# Patient Record
Sex: MALE
[De-identification: names, ages, dates, MRNs, and addresses within clinical notes are randomized per-mention and may not be internally consistent; named-entity substitution may affect disease eponyms.]

---

## 2017-07-07 ENCOUNTER — HOSPITAL ENCOUNTER (OUTPATIENT)
Dept: HOSPITAL 5 - MRI | Age: 77
Discharge: HOME | End: 2017-07-07
Attending: NEUROLOGICAL SURGERY
Payer: MEDICARE

## 2017-07-07 DIAGNOSIS — I73.9: ICD-10-CM

## 2017-07-07 DIAGNOSIS — E78.00: ICD-10-CM

## 2017-07-07 DIAGNOSIS — M48.54XA: ICD-10-CM

## 2017-07-07 DIAGNOSIS — D64.9: ICD-10-CM

## 2017-07-07 DIAGNOSIS — M25.78: ICD-10-CM

## 2017-07-07 DIAGNOSIS — F32.9: ICD-10-CM

## 2017-07-07 DIAGNOSIS — I10: ICD-10-CM

## 2017-07-07 DIAGNOSIS — M12.88: ICD-10-CM

## 2017-07-07 DIAGNOSIS — M41.86: ICD-10-CM

## 2017-07-07 DIAGNOSIS — M51.36: ICD-10-CM

## 2017-07-07 DIAGNOSIS — M48.06: Primary | ICD-10-CM

## 2017-07-07 LAB — BUN SERPL-MCNC: 30 MG/DL (ref 9–20)

## 2017-07-07 PROCEDURE — 72148 MRI LUMBAR SPINE W/O DYE: CPT

## 2017-07-07 PROCEDURE — 84520 ASSAY OF UREA NITROGEN: CPT

## 2017-07-07 PROCEDURE — 36415 COLL VENOUS BLD VENIPUNCTURE: CPT

## 2017-07-07 PROCEDURE — 72114 X-RAY EXAM L-S SPINE BENDING: CPT

## 2017-07-07 PROCEDURE — 82565 ASSAY OF CREATININE: CPT

## 2017-07-08 NOTE — XRAY REPORT
LUMBAR SPINE WITH OBLIQUES AND FLEXION-EXTENSION SEVEN VIEWS: 07/07/17 

16:34:00





CLINICAL: Lumbar stenosis with neurogenic claudication.



FINDINGS: Levoscoliosis centered at L3-4.  Grade I L2-3 retrolisthesis 

is identified in neutral as well as flexed and extended positions.  On 

the AP view, grade I right lateral listhesis at L3-4.  Disc space 

narrowing with anterior-posterior osteophytes and endplate sclerosis at 

L2-3 and L1-2.  Mild anterior T12 wedge compression fracture with no 

fracture lines identified.  Large lateral osteophytes at T12-L1 and on 

the left at L2-3, L3-4 and L4-5.  Severe multilevel bilateral neural 

foraminal stenosis on oblique views.  Multilevel facet joint sclerosis. 

 There is an IVC filter.  Mild calcification of the abdominal aorta and 

iliac arteries.  



IMPRESSION: Scoliosis and extensive multilevel degenerative disc 

disease.Grade I L2-3 retrolisthesis with no instability on flexion and 

extension.  Grade I right lateral listhesis at L3-4.  Extensive 

multilevel facet joint arthropathy and multilevel neural foraminal 

stenosis.  A probably chronic T12 anterior wedge compression fracture.

## 2017-07-09 NOTE — MAGNETIC RESONANCE REPORT
MRI LUMBAR SPINE WITHOUT CONTRAST: 07/07/17



CLINICAL: Lumbar Stenosis.

COMPARISON: 09/16/14 CT lumbar spine





TECHNIQUE: Sagittal and axial T1 and T2, and sagittal STIR sequences on 

a 1.5 Denisha magnet without contrast.  



FINDINGS: Grade I L2-3 retrolisthesis and grade I L5-S1 retrolisthesis. 

 Status post midline posterior decompression and laminectomy from L1 to 

through L4.  The conus medullaris is normal and terminates at L1.



L1-2: Degeneration of the disc and mild circumferential disc bulge.  

Large anterior osteophyte and minimal posterior osteophyte. 

Irregularity of the inferior L1 and superior L2 endplates with mild 

Modic signal changes.  Moderate left and mild right neural foraminal 

stenosis.



L2-3: Degeneration of the disc with narrowing of the disc space and 

large anterior osteophyte.  Grade I L2-3 retrolisthesis.  Bilateral 

facet hypertrophy.  Marked right neural foraminal stenosis and moderate 

left neural foraminal stenosis secondary to osteophytes and facet 

hypertrophy.



L3-4: Moderate circumferential disc bulge.  Bilateral facet hypertrophy 

and marked bilateral neural foraminal stenosis secondary to facet 

hypertrophy and osteophytes.



L4-5: Degeneration of the disc and a small posterior central 

osteophyte.  Bilateral facet hypertrophy and marked bilateral neural 

foraminal stenosis secondary to facet hypertrophy and osteophytes.



L5-S1: Mild circumferential disc bulge.  Bilateral facet hypertrophy 

and marked bilateral neural foraminal stenosis secondary to facet 

hypertrophy and osteophytes.



IMPRESSION: 1. Status post midline posterior decompression and 

laminectomy from L1 to through L4.  Minimal postsurgical scar.  2.  

Multilevel neural foraminal stenosis secondary to facet hypertrophy and 

osteophytes.  3.  No central canal stenosis.

## 2020-01-20 ENCOUNTER — HOSPITAL ENCOUNTER (OUTPATIENT)
Dept: HOSPITAL 5 - XRAY | Age: 80
Discharge: HOME | End: 2020-01-20
Attending: INTERNAL MEDICINE
Payer: MEDICARE

## 2020-01-20 DIAGNOSIS — R79.1: ICD-10-CM

## 2020-01-20 DIAGNOSIS — Z01.810: Primary | ICD-10-CM

## 2020-01-20 LAB
ALBUMIN SERPL-MCNC: 3.7 G/DL (ref 3.9–5)
ALT SERPL-CCNC: 10 UNITS/L (ref 7–56)
APTT BLD: 38.7 SEC. (ref 24.2–36.6)
BASOPHILS # (AUTO): 0.1 K/MM3 (ref 0–0.1)
BASOPHILS NFR BLD AUTO: 0.8 % (ref 0–1.8)
BUN SERPL-MCNC: 19 MG/DL (ref 9–20)
BUN/CREAT SERPL: 16 %
CALCIUM SERPL-MCNC: 9.1 MG/DL (ref 8.4–10.2)
EOSINOPHIL # BLD AUTO: 0.7 K/MM3 (ref 0–0.4)
EOSINOPHIL NFR BLD AUTO: 8.3 % (ref 0–4.3)
HCT VFR BLD CALC: 33.3 % (ref 35.5–45.6)
HEMOLYSIS INDEX: 0
HGB BLD-MCNC: 11 GM/DL (ref 11.8–15.2)
INR PPP: 1.81 (ref 0.87–1.13)
LYMPHOCYTES # BLD AUTO: 2.2 K/MM3 (ref 1.2–5.4)
LYMPHOCYTES NFR BLD AUTO: 26.9 % (ref 13.4–35)
MCHC RBC AUTO-ENTMCNC: 33 % (ref 32–34)
MCV RBC AUTO: 97 FL (ref 84–94)
MONOCYTES # (AUTO): 0.8 K/MM3 (ref 0–0.8)
MONOCYTES % (AUTO): 9.8 % (ref 0–7.3)
PLATELET # BLD: 297 K/MM3 (ref 140–440)
RBC # BLD AUTO: 3.42 M/MM3 (ref 3.65–5.03)

## 2020-01-20 PROCEDURE — 82306 VITAMIN D 25 HYDROXY: CPT

## 2020-01-20 PROCEDURE — 85025 COMPLETE CBC W/AUTO DIFF WBC: CPT

## 2020-01-20 PROCEDURE — 36415 COLL VENOUS BLD VENIPUNCTURE: CPT

## 2020-01-20 PROCEDURE — 85610 PROTHROMBIN TIME: CPT

## 2020-01-20 PROCEDURE — 82607 VITAMIN B-12: CPT

## 2020-01-20 PROCEDURE — 71046 X-RAY EXAM CHEST 2 VIEWS: CPT

## 2020-01-20 PROCEDURE — 80053 COMPREHEN METABOLIC PANEL: CPT

## 2020-01-20 PROCEDURE — 85730 THROMBOPLASTIN TIME PARTIAL: CPT

## 2020-01-20 NOTE — XRAY REPORT
CHEST 2 VIEWS 



INDICATION:  Z01.810 ENCOUNTER FOR PREPROCEDURAL CARDIOVASCULAR EXAM.



COMPARISON:  None



FINDINGS:

Support devices: Thoracic stimulator device terminates near the level of T8.



Heart: Within normal limits. 

Lungs/pleura: No acute air space or interstitial disease.  No pneumothorax.



Additional findings: None.



IMPRESSION:

No acute findings.



Signer Name: Abdullahi Valdovinos Jr, MD 

Signed: 1/20/2020 11:21 AM

 Workstation Name: [id number]

## 2020-01-23 LAB — VITAMIN D2 SERPL-MCNC: 36 NG/ML

## 2020-05-28 ENCOUNTER — HOSPITAL ENCOUNTER (OUTPATIENT)
Dept: HOSPITAL 5 - XRAY | Age: 80
Discharge: HOME | End: 2020-05-28
Attending: INTERNAL MEDICINE
Payer: MEDICARE

## 2020-05-28 DIAGNOSIS — Z01.810: Primary | ICD-10-CM

## 2020-05-28 DIAGNOSIS — I26.99: ICD-10-CM

## 2020-05-28 LAB
ALBUMIN SERPL-MCNC: 4 G/DL (ref 3.9–5)
ALT SERPL-CCNC: 11 UNITS/L (ref 7–56)
APTT BLD: 35.1 SEC. (ref 24.2–36.6)
BASOPHILS # (AUTO): 0.1 K/MM3 (ref 0–0.1)
BASOPHILS NFR BLD AUTO: 0.6 % (ref 0–1.8)
BUN SERPL-MCNC: 22 MG/DL (ref 9–20)
BUN/CREAT SERPL: 17 %
CALCIUM SERPL-MCNC: 9.9 MG/DL (ref 8.4–10.2)
EOSINOPHIL # BLD AUTO: 0.4 K/MM3 (ref 0–0.4)
EOSINOPHIL NFR BLD AUTO: 4.4 % (ref 0–4.3)
HCT VFR BLD CALC: 47.1 % (ref 35.5–45.6)
HDLC SERPL-MCNC: 43 MG/DL (ref 40–59)
HEMOLYSIS INDEX: 22
HGB BLD-MCNC: 15.5 GM/DL (ref 11.8–15.2)
INR PPP: 1.87 (ref 0.87–1.13)
LYMPHOCYTES # BLD AUTO: 2.3 K/MM3 (ref 1.2–5.4)
LYMPHOCYTES NFR BLD AUTO: 27 % (ref 13.4–35)
MCHC RBC AUTO-ENTMCNC: 33 % (ref 32–34)
MCV RBC AUTO: 99 FL (ref 84–94)
MONOCYTES # (AUTO): 0.6 K/MM3 (ref 0–0.8)
MONOCYTES % (AUTO): 7.4 % (ref 0–7.3)
PLATELET # BLD: 258 K/MM3 (ref 140–440)
RBC # BLD AUTO: 4.76 M/MM3 (ref 3.65–5.03)

## 2020-05-28 PROCEDURE — 85610 PROTHROMBIN TIME: CPT

## 2020-05-28 PROCEDURE — 83036 HEMOGLOBIN GLYCOSYLATED A1C: CPT

## 2020-05-28 PROCEDURE — 36415 COLL VENOUS BLD VENIPUNCTURE: CPT

## 2020-05-28 PROCEDURE — 71046 X-RAY EXAM CHEST 2 VIEWS: CPT

## 2020-05-28 PROCEDURE — 85730 THROMBOPLASTIN TIME PARTIAL: CPT

## 2020-05-28 PROCEDURE — 80061 LIPID PANEL: CPT

## 2020-05-28 PROCEDURE — 84443 ASSAY THYROID STIM HORMONE: CPT

## 2020-05-28 PROCEDURE — 80053 COMPREHEN METABOLIC PANEL: CPT

## 2020-05-28 PROCEDURE — 85025 COMPLETE CBC W/AUTO DIFF WBC: CPT

## 2020-05-28 NOTE — XRAY REPORT
CHEST 2 VIEWS 



INDICATION / CLINICAL INFORMATION:

ENCOUNTER FOR PREPROCEDURAL CARDIOVASCULAR EXAMINATION.



COMPARISON: 

1/20/2020



FINDINGS:



SUPPORT DEVICES: None.



HEART / MEDIASTINUM: No significant abnormality. 



LUNGS / PLEURA: No significant pulmonary or pleural abnormality. No pneumothorax. 



ADDITIONAL FINDINGS: No significant additional findings.



IMPRESSION:

1. No acute findings.



Signer Name: Stone Waterman MD 

Signed: 5/28/2020 11:42 AM

Workstation Name: [id number]

## 2020-06-03 ENCOUNTER — TELEPHONE ENCOUNTER (OUTPATIENT)
Dept: URBAN - METROPOLITAN AREA CLINIC 92 | Facility: CLINIC | Age: 80
End: 2020-06-03

## 2020-06-03 RX ORDER — SUCRALFATE 1 G/1
1 TABLET ON AN EMPTY STOMACH TABLET ORAL TID
Qty: 270 TABLET | Refills: 3 | OUTPATIENT
Start: 2020-06-03 | End: 2021-05-29

## 2020-06-03 RX ORDER — PANTOPRAZOLE SODIUM 40 MG/1
TAKE 1 TABLET (40 MG) BY ORAL ROUTE ONCE DAILY FOR 90 DAYS TABLET, DELAYED RELEASE ORAL 1
Qty: 90 | Refills: 3 | Status: ACTIVE | COMMUNITY
Start: 2020-03-18 | End: 2021-03-13

## 2020-06-03 RX ORDER — FINASTERIDE 5 MG/1
TAKE 1 TABLET (5 MG) BY ORAL ROUTE ONCE DAILY TABLET, FILM COATED ORAL 1
Qty: 0 | Refills: 0 | Status: ACTIVE | COMMUNITY
Start: 1900-01-01 | End: 1900-01-01

## 2020-06-03 RX ORDER — SUCRALFATE 1 G/1
TAKE 1 TABLET BY ORAL ROUTE 3 TIMES A DAY AS NEEDED FOR 30 DAYS TABLET ORAL
Qty: 90 | Refills: 5 | Status: ACTIVE | COMMUNITY
Start: 2020-04-30 | End: 2020-10-27

## 2020-06-03 RX ORDER — TAMSULOSIN HYDROCHLORIDE 0.4 MG/1
CAPSULE ORAL
Qty: 0 | Refills: 0 | Status: ACTIVE | COMMUNITY
Start: 1900-01-01 | End: 1900-01-01

## 2020-06-04 ENCOUNTER — TELEPHONE ENCOUNTER (OUTPATIENT)
Dept: URBAN - METROPOLITAN AREA CLINIC 92 | Facility: CLINIC | Age: 80
End: 2020-06-04

## 2020-06-04 RX ORDER — SUCRALFATE 1 G/1
1 TABLET ON AN EMPTY STOMACH TABLET ORAL TWICE A DAY
Qty: 180 TABLET | Refills: 3 | OUTPATIENT
Start: 2020-06-04 | End: 2021-05-30

## 2020-07-13 ENCOUNTER — HOSPITAL ENCOUNTER (OUTPATIENT)
Dept: HOSPITAL 5 - MAMMO | Age: 80
Discharge: HOME | End: 2020-07-13
Attending: INTERNAL MEDICINE
Payer: MEDICARE

## 2020-07-13 DIAGNOSIS — M51.36: Primary | ICD-10-CM

## 2020-07-13 DIAGNOSIS — M85.88: ICD-10-CM

## 2020-07-13 PROCEDURE — 77080 DXA BONE DENSITY AXIAL: CPT

## 2020-07-17 NOTE — MAMMOGRAPHY REPORT
DEXA BONE DENSITY SCAN



INDICATION / CLINICAL INFORMATION:

DISC DEGENERATION.

79 years Male



COMPARISON: 

None available.



LUMBAR SPINE, L1-L4:

- Bone mineral density (BMD) = 1.442 g/cm2.

- T-score = 3.2 

- Z-score = 4.4 



Spine density may be artificially elevated by degenerative sclerosis.



LEFT HIP, NECK :

- Bone mineral density (BMD) = 0.678 g/cm2.

- T-score = -1.9 

- Z-score = -0.3 



Change (%) since most recent prior (if available):  None available.



10-YEAR FRACTURE RISK (FRAX) (%) based on LEFT FEMUR

- Major Osteoporotic Fracture / Hip Fracture: 7.9% / 3.1% 



IMPRESSION:

1. WHO Classification: Osteopenia. Fracture Risk: Increased.







=================================================================

BMD Reporting Guidelines (ISCD, 2015)

=================================================================

BMD Reporting in Postmenopausal Women and in Men Age 50 and Older

- T-scores are preferred.

- The WHO densitometric classification is applicable.



BMD Reporting in Females Prior to Menopause and in Males Younger Than Age 50

- Z-scores, not T-scores, are preferred. This is particularly important in children.

- A Z-score of -2.0 or lower is defined as below the expected range for age, and a Z-score above -2.0
 is within the expected range for age.

- Osteoporosis cannot be diagnosed in men under age 50 on the basis of BMD alone.

- The WHO diagnostic criteria may be applied to women in the menopausal transition.





http://www.iscd.org/official-positions/8692-auvf-ponovpah-positions-adult/





Signer Name: WARREN Romano MD 

Signed: 7/17/2020 5:29 PM

Workstation Name: [id number]

## 2020-09-10 ENCOUNTER — OFFICE VISIT (OUTPATIENT)
Dept: URBAN - METROPOLITAN AREA CLINIC 118 | Facility: CLINIC | Age: 80
End: 2020-09-10
Payer: MEDICARE

## 2020-09-10 DIAGNOSIS — K29.50 CHRONIC ANTRAL GASTRITIS: ICD-10-CM

## 2020-09-10 DIAGNOSIS — K59.03 DRUG-INDUCED CONSTIPATION: ICD-10-CM

## 2020-09-10 DIAGNOSIS — K30 NUD (NONULCER DYSPEPSIA): ICD-10-CM

## 2020-09-10 DIAGNOSIS — R10.13 EPIGASTRIC ABDOMINAL PAIN: ICD-10-CM

## 2020-09-10 PROCEDURE — G8420 CALC BMI NORM PARAMETERS: HCPCS | Performed by: INTERNAL MEDICINE

## 2020-09-10 PROCEDURE — 1036F TOBACCO NON-USER: CPT | Performed by: INTERNAL MEDICINE

## 2020-09-10 PROCEDURE — G8427 DOCREV CUR MEDS BY ELIG CLIN: HCPCS | Performed by: INTERNAL MEDICINE

## 2020-09-10 PROCEDURE — 99214 OFFICE O/P EST MOD 30 MIN: CPT | Performed by: INTERNAL MEDICINE

## 2020-09-10 RX ORDER — SUCRALFATE 1 G/1
TAKE 1 TABLET BY ORAL ROUTE 3 TIMES A DAY AS NEEDED FOR 30 DAYS TABLET ORAL
Qty: 90 | Refills: 5 | Status: ACTIVE | COMMUNITY
Start: 2020-04-30 | End: 2020-10-27

## 2020-09-10 RX ORDER — TAMSULOSIN HYDROCHLORIDE 0.4 MG/1
CAPSULE ORAL
Qty: 0 | Refills: 0 | Status: ACTIVE | COMMUNITY
Start: 1900-01-01

## 2020-09-10 RX ORDER — SUCRALFATE 1 G/1
1 TABLET ON AN EMPTY STOMACH TABLET ORAL TID
Qty: 270 TABLET | Refills: 3 | Status: ACTIVE | COMMUNITY
Start: 2020-06-03 | End: 2021-05-29

## 2020-09-10 RX ORDER — FINASTERIDE 5 MG/1
TAKE 1 TABLET (5 MG) BY ORAL ROUTE ONCE DAILY TABLET, FILM COATED ORAL 1
Qty: 0 | Refills: 0 | Status: ACTIVE | COMMUNITY
Start: 1900-01-01

## 2020-09-10 RX ORDER — PANTOPRAZOLE SODIUM 40 MG/1
TAKE 1 TABLET (40 MG) BY ORAL ROUTE ONCE DAILY FOR 90 DAYS TABLET, DELAYED RELEASE ORAL 1
Qty: 90 | Refills: 3 | Status: ACTIVE | COMMUNITY
Start: 2020-03-18 | End: 2021-03-13

## 2020-09-10 RX ORDER — SUCRALFATE 1 G/1
1 TABLET ON AN EMPTY STOMACH TABLET ORAL TWICE A DAY
Qty: 180 TABLET | Refills: 3 | Status: ACTIVE | COMMUNITY
Start: 2020-06-04 | End: 2021-05-30

## 2020-09-10 NOTE — HPI-TODAY'S VISIT:
The patient was last seen in 2019 prior to an attack of GS pancreatitis.  He had a CCY/ERCP, and did well.  Then, he underwent further back surgery in 2020, and is now weaning off all narcotics.  He has no N/V, and his constipation is mildly better.  He has last another 5 pounds through watching his weight, but he continues to have some mild epigastric tenderness, though not nearly as severe as his pancreatitis.  He is compliant with PPI/carafate therapy for his NUD/GERD.  He is not using NSAIDs.  He sees no blood in his stools.

## 2020-09-11 LAB
ALBUMIN: 3.6
ALKALINE PHOSPHATASE: 82
ALT (SGPT): 10
AST (SGOT): 15
BILIRUBIN, DIRECT: 0.09
BILIRUBIN, TOTAL: 0.3
LIPASE: 55
PROTEIN, TOTAL: 6.8

## 2020-11-11 ENCOUNTER — HOSPITAL ENCOUNTER (OUTPATIENT)
Dept: HOSPITAL 5 - LAB | Age: 80
Discharge: HOME | End: 2020-11-11
Attending: INTERNAL MEDICINE
Payer: MEDICARE

## 2020-11-11 DIAGNOSIS — R94.6: ICD-10-CM

## 2020-11-11 DIAGNOSIS — D64.9: Primary | ICD-10-CM

## 2020-11-11 DIAGNOSIS — R73.03: ICD-10-CM

## 2020-11-11 LAB
BASOPHILS # (AUTO): 0 K/MM3 (ref 0–0.1)
BASOPHILS NFR BLD AUTO: 0.5 % (ref 0–1.8)
BUN SERPL-MCNC: 26 MG/DL (ref 9–20)
BUN/CREAT SERPL: 24 %
CALCIUM SERPL-MCNC: 9.5 MG/DL (ref 8.4–10.2)
EOSINOPHIL # BLD AUTO: 0.1 K/MM3 (ref 0–0.4)
EOSINOPHIL NFR BLD AUTO: 1 % (ref 0–4.3)
HCT VFR BLD CALC: 35.9 % (ref 35.5–45.6)
HEMOLYSIS INDEX: 2
HGB BLD-MCNC: 12.2 GM/DL (ref 11.8–15.2)
IRON SERPL-MCNC: 38 UG/DL (ref 49–181)
LYMPHOCYTES # BLD AUTO: 1.8 K/MM3 (ref 1.2–5.4)
LYMPHOCYTES NFR BLD AUTO: 16.8 % (ref 13.4–35)
MCHC RBC AUTO-ENTMCNC: 34 % (ref 32–34)
MCV RBC AUTO: 92 FL (ref 84–94)
MONOCYTES # (AUTO): 0.8 K/MM3 (ref 0–0.8)
MONOCYTES % (AUTO): 7.2 % (ref 0–7.3)
PLATELET # BLD: 233 K/MM3 (ref 140–440)
RBC # BLD AUTO: 3.92 M/MM3 (ref 3.65–5.03)

## 2020-11-11 PROCEDURE — 84443 ASSAY THYROID STIM HORMONE: CPT

## 2020-11-11 PROCEDURE — 36415 COLL VENOUS BLD VENIPUNCTURE: CPT

## 2020-11-11 PROCEDURE — 85025 COMPLETE CBC W/AUTO DIFF WBC: CPT

## 2020-11-11 PROCEDURE — 80048 BASIC METABOLIC PNL TOTAL CA: CPT

## 2020-11-11 PROCEDURE — 83540 ASSAY OF IRON: CPT

## 2020-11-11 PROCEDURE — 82728 ASSAY OF FERRITIN: CPT

## 2021-03-02 ENCOUNTER — OFFICE VISIT (OUTPATIENT)
Dept: URBAN - METROPOLITAN AREA CLINIC 118 | Facility: CLINIC | Age: 81
End: 2021-03-02
Payer: MEDICARE

## 2021-03-02 ENCOUNTER — LAB OUTSIDE AN ENCOUNTER (OUTPATIENT)
Dept: URBAN - METROPOLITAN AREA CLINIC 118 | Facility: CLINIC | Age: 81
End: 2021-03-02

## 2021-03-02 DIAGNOSIS — K59.01 SLOW TRANSIT CONSTIPATION: ICD-10-CM

## 2021-03-02 DIAGNOSIS — R63.0 ANOREXIA: ICD-10-CM

## 2021-03-02 DIAGNOSIS — K30 NUD (NONULCER DYSPEPSIA): ICD-10-CM

## 2021-03-02 DIAGNOSIS — R68.81 EARLY SATIETY: ICD-10-CM

## 2021-03-02 PROCEDURE — 99214 OFFICE O/P EST MOD 30 MIN: CPT | Performed by: INTERNAL MEDICINE

## 2021-03-02 RX ORDER — TAMSULOSIN HYDROCHLORIDE 0.4 MG/1
CAPSULE ORAL
Qty: 0 | Refills: 0 | Status: ACTIVE | COMMUNITY
Start: 1900-01-01

## 2021-03-02 RX ORDER — SUCRALFATE 1 G/1
1 TABLET ON AN EMPTY STOMACH TABLET ORAL TID
Qty: 270 TABLET | Refills: 3 | Status: ACTIVE | COMMUNITY
Start: 2020-06-03 | End: 2021-05-29

## 2021-03-02 RX ORDER — SUCRALFATE 1 G/1
1 TABLET ON AN EMPTY STOMACH TABLET ORAL TWICE A DAY
Qty: 180 TABLET | Refills: 3 | Status: DISCONTINUED | COMMUNITY
Start: 2020-06-04 | End: 2021-05-30

## 2021-03-02 RX ORDER — FINASTERIDE 5 MG/1
TAKE 1 TABLET (5 MG) BY ORAL ROUTE ONCE DAILY TABLET, FILM COATED ORAL 1
Qty: 0 | Refills: 0 | Status: ACTIVE | COMMUNITY
Start: 1900-01-01

## 2021-03-02 RX ORDER — PANTOPRAZOLE SODIUM 40 MG/1
TAKE 1 TABLET (40 MG) BY ORAL ROUTE ONCE DAILY FOR 90 DAYS TABLET, DELAYED RELEASE ORAL 1
Qty: 90 | Refills: 3 | Status: ACTIVE | COMMUNITY
Start: 2020-03-18 | End: 2021-03-13

## 2021-03-02 NOTE — HPI-TODAY'S VISIT:
The patient is following up after a visit in September for dyspepsia and abdominal pain.  His lab work, including pancreas and liver labs, were within normal limits.  He has had a worsening of his epigastric burning with some early satiety ever since starting potassium citrate by his nephrologist.  However he denies vomiting, weight loss, blood with his bowel movements, or hematemesis.  He is still occasionally using narcotics for his chronic back pain, but this only causes mild constipation not needing laxative therapy.  He is on Carafate therapy, which he thinks may be worsening his constipation.  However, he has a bowel movement almost every day.  He is compliant with his current Protonix therapy on a daily basis.  His weight is stable without any significant loss since his last visit.

## 2021-03-04 PROBLEM — 3696007: Status: ACTIVE | Noted: 2021-03-04

## 2021-03-04 PROBLEM — 35298007: Status: ACTIVE | Noted: 2021-03-04

## 2021-05-09 ENCOUNTER — ERX REFILL RESPONSE (OUTPATIENT)
Dept: URBAN - METROPOLITAN AREA CLINIC 118 | Facility: CLINIC | Age: 81
End: 2021-05-09

## 2021-05-09 RX ORDER — PANTOPRAZOLE SODIUM 40 MG/1
TAKE 1 TABLET DAILY TABLET, DELAYED RELEASE ORAL
Qty: 90 | Refills: 3

## 2021-11-12 ENCOUNTER — TELEPHONE ENCOUNTER (OUTPATIENT)
Dept: URBAN - METROPOLITAN AREA CLINIC 118 | Facility: CLINIC | Age: 81
End: 2021-11-12

## 2021-11-19 ENCOUNTER — LAB OUTSIDE AN ENCOUNTER (OUTPATIENT)
Dept: URBAN - METROPOLITAN AREA CLINIC 118 | Facility: CLINIC | Age: 81
End: 2021-11-19

## 2021-11-19 ENCOUNTER — OFFICE VISIT (OUTPATIENT)
Dept: URBAN - METROPOLITAN AREA CLINIC 118 | Facility: CLINIC | Age: 81
End: 2021-11-19
Payer: MEDICARE

## 2021-11-19 DIAGNOSIS — R93.3 ABNORMAL UPPER GASTROINTESTINAL BARIUM SERIES: ICD-10-CM

## 2021-11-19 DIAGNOSIS — R68.81 EARLY SATIETY: ICD-10-CM

## 2021-11-19 PROBLEM — 79890006: Status: ACTIVE | Noted: 2021-03-02

## 2021-11-19 PROCEDURE — 99214 OFFICE O/P EST MOD 30 MIN: CPT | Performed by: INTERNAL MEDICINE

## 2021-11-19 RX ORDER — FINASTERIDE 5 MG/1
TAKE 1 TABLET (5 MG) BY ORAL ROUTE ONCE DAILY TABLET, FILM COATED ORAL 1
Qty: 0 | Refills: 0 | Status: ACTIVE | COMMUNITY
Start: 1900-01-01

## 2021-11-19 RX ORDER — TAMSULOSIN HYDROCHLORIDE 0.4 MG/1
CAPSULE ORAL
Qty: 0 | Refills: 0 | Status: ACTIVE | COMMUNITY
Start: 1900-01-01

## 2021-11-19 RX ORDER — PANTOPRAZOLE SODIUM 40 MG/1
TAKE 1 TABLET DAILY TABLET, DELAYED RELEASE ORAL
Qty: 90 | Refills: 3 | Status: ACTIVE | COMMUNITY

## 2021-11-19 NOTE — HPI-TODAY'S VISIT:
The patient is following up after an office visit in March.  He had an upper GI series that showed significant gastritis reflux, and dysmotility.  He continues to have problems with dyspepsia despite taking Protonix every morning.  His weight is stable however and there is no hematemesis.  He describes early satiety with bloating will often eat small volumes of meals, but has gained 4 pounds since his last visit.  There is no blood in the stools.  He denies excess NSAID use and has decreased the amount of narcotics to about once a week.  He does have an upcoming hip surgery for a hip fracture from a recent fall.  He has changed no other new medications.

## 2021-11-22 ENCOUNTER — OFFICE VISIT (OUTPATIENT)
Dept: URBAN - METROPOLITAN AREA SURGERY CENTER 23 | Facility: SURGERY CENTER | Age: 81
End: 2021-11-22
Payer: MEDICARE

## 2021-11-22 ENCOUNTER — CLAIMS CREATED FROM THE CLAIM WINDOW (OUTPATIENT)
Dept: URBAN - METROPOLITAN AREA CLINIC 4 | Facility: CLINIC | Age: 81
End: 2021-11-22
Payer: MEDICARE

## 2021-11-22 DIAGNOSIS — K31.89 DEFORMED PYLORUS, ACQUIRED: ICD-10-CM

## 2021-11-22 DIAGNOSIS — K31.89 ACQUIRED DEFORMITY OF DUODENUM: ICD-10-CM

## 2021-11-22 DIAGNOSIS — R93.3 ABN FINDINGS-GI TRACT: ICD-10-CM

## 2021-11-22 PROCEDURE — G8907 PT DOC NO EVENTS ON DISCHARG: HCPCS | Performed by: INTERNAL MEDICINE

## 2021-11-22 PROCEDURE — 88305 TISSUE EXAM BY PATHOLOGIST: CPT | Performed by: PATHOLOGY

## 2021-11-22 PROCEDURE — 43239 EGD BIOPSY SINGLE/MULTIPLE: CPT | Performed by: INTERNAL MEDICINE

## 2021-11-22 PROCEDURE — 88312 SPECIAL STAINS GROUP 1: CPT | Performed by: PATHOLOGY

## 2022-03-18 ENCOUNTER — ERX REFILL RESPONSE (OUTPATIENT)
Dept: URBAN - METROPOLITAN AREA CLINIC 118 | Facility: CLINIC | Age: 82
End: 2022-03-18

## 2022-03-18 RX ORDER — PANTOPRAZOLE SODIUM 40 MG/1
TAKE 1 TABLET DAILY TABLET, DELAYED RELEASE ORAL
Qty: 90 TABLET | Refills: 4 | OUTPATIENT

## 2022-10-26 ENCOUNTER — OFFICE VISIT (OUTPATIENT)
Dept: URBAN - METROPOLITAN AREA CLINIC 118 | Facility: CLINIC | Age: 82
End: 2022-10-26
Payer: MEDICARE

## 2022-10-26 ENCOUNTER — LAB OUTSIDE AN ENCOUNTER (OUTPATIENT)
Dept: URBAN - METROPOLITAN AREA CLINIC 118 | Facility: CLINIC | Age: 82
End: 2022-10-26

## 2022-10-26 VITALS
TEMPERATURE: 97.7 F | WEIGHT: 225.4 LBS | SYSTOLIC BLOOD PRESSURE: 136 MMHG | HEIGHT: 69 IN | HEART RATE: 67 BPM | BODY MASS INDEX: 33.38 KG/M2 | DIASTOLIC BLOOD PRESSURE: 87 MMHG

## 2022-10-26 DIAGNOSIS — R68.81 EARLY SATIETY: ICD-10-CM

## 2022-10-26 DIAGNOSIS — K59.04 CHRONIC IDIOPATHIC CONSTIPATION: ICD-10-CM

## 2022-10-26 PROCEDURE — 99214 OFFICE O/P EST MOD 30 MIN: CPT | Performed by: INTERNAL MEDICINE

## 2022-10-26 RX ORDER — ALLOPURINOL 100 MG/1
1 TABLET TABLET ORAL ONCE A DAY
Refills: 0 | Status: ACTIVE | COMMUNITY
Start: 1900-01-01

## 2022-10-26 RX ORDER — RIVAROXABAN 20 MG/1
1 TABLET TABLET, FILM COATED ORAL ONCE A DAY
Refills: 0 | Status: ACTIVE | COMMUNITY
Start: 1900-01-01

## 2022-10-26 RX ORDER — PANTOPRAZOLE SODIUM 40 MG/1
TAKE 1 TABLET DAILY TABLET, DELAYED RELEASE ORAL
Qty: 90 TABLET | Refills: 4 | Status: ACTIVE | COMMUNITY

## 2022-10-26 RX ORDER — POTASSIUM CITRATE 15 MEQ/1
1 TABLET WITH MEALS TABLET, EXTENDED RELEASE ORAL TWICE A DAY
Status: ACTIVE | COMMUNITY

## 2022-10-26 RX ORDER — TAMSULOSIN HYDROCHLORIDE 0.4 MG/1
CAPSULE ORAL
Qty: 0 | Refills: 0 | Status: ACTIVE | COMMUNITY
Start: 1900-01-01

## 2022-10-26 RX ORDER — FINASTERIDE 5 MG/1
TAKE 1 TABLET (5 MG) BY ORAL ROUTE ONCE DAILY TABLET, FILM COATED ORAL 1
Qty: 0 | Refills: 0 | Status: ACTIVE | COMMUNITY
Start: 1900-01-01

## 2022-10-26 RX ORDER — AMLODIPINE BESYLATE AND BENAZEPRIL HYDROCHLORIDE 5; 10 MG/1; MG/1
AS DIRECTED CAPSULE ORAL
Refills: 0 | Status: ACTIVE | COMMUNITY
Start: 1900-01-01

## 2022-10-26 RX ORDER — GLUCOSAMINE SULFATE 500 MG
2 CAPSULE WITH A MEAL CAPSULE ORAL
Status: ACTIVE | COMMUNITY

## 2022-10-26 RX ORDER — SIMVASTATIN 40 MG
1 TABLET IN THE EVENING TABLET ORAL ONCE A DAY
Refills: 0 | Status: ACTIVE | COMMUNITY
Start: 1900-01-01

## 2022-10-26 NOTE — PHYSICAL EXAM MUSCULOSKELETAL:
normal gait and station , no tenderness or deformities present Adequate: hears normal conversation without difficulty

## 2022-10-26 NOTE — HPI-TODAY'S VISIT:
Patient is following up after an upper endoscopy last year.  This was done for early satiety but there was no signs of gastric outlet obstruction, peptic ulcer disease, or other mucosal abnormality.  He continues to have severe early satiety but despite that has gained 11 pounds.  He has no significant vomiting but feels bloated after almost every meal and is down to eating 1 meal a day.  He has continued constipation despite discontinuing all narcotics.  He has tried occasional over-the-counter laxatives but not on a daily basis.  His bowel movements are every 4 to 5 days followed by explosive diarrhea.  He has no blood in the stool.  There is no significant abdominal pain just discomfort from the bloating.  He has no history of documented gastroparesis.

## 2022-11-08 ENCOUNTER — TELEPHONE ENCOUNTER (OUTPATIENT)
Dept: URBAN - METROPOLITAN AREA CLINIC 118 | Facility: CLINIC | Age: 82
End: 2022-11-08

## 2022-11-09 ENCOUNTER — TELEPHONE ENCOUNTER (OUTPATIENT)
Dept: URBAN - METROPOLITAN AREA CLINIC 118 | Facility: CLINIC | Age: 82
End: 2022-11-09

## 2022-11-16 ENCOUNTER — LAB OUTSIDE AN ENCOUNTER (OUTPATIENT)
Dept: URBAN - METROPOLITAN AREA CLINIC 118 | Facility: CLINIC | Age: 82
End: 2022-11-16

## 2022-11-16 ENCOUNTER — CLAIMS CREATED FROM THE CLAIM WINDOW (OUTPATIENT)
Dept: URBAN - METROPOLITAN AREA CLINIC 118 | Facility: CLINIC | Age: 82
End: 2022-11-16
Payer: MEDICARE

## 2022-11-16 VITALS
DIASTOLIC BLOOD PRESSURE: 67 MMHG | TEMPERATURE: 97.5 F | HEART RATE: 68 BPM | WEIGHT: 211.2 LBS | SYSTOLIC BLOOD PRESSURE: 100 MMHG | HEIGHT: 69 IN | BODY MASS INDEX: 31.28 KG/M2

## 2022-11-16 DIAGNOSIS — R10.84 GENERALIZED ABDOMINAL PAIN: ICD-10-CM

## 2022-11-16 DIAGNOSIS — R14.0 ABDOMINAL BLOATING: ICD-10-CM

## 2022-11-16 DIAGNOSIS — R63.4 ABNORMAL LOSS OF WEIGHT: ICD-10-CM

## 2022-11-16 PROCEDURE — 99214 OFFICE O/P EST MOD 30 MIN: CPT | Performed by: INTERNAL MEDICINE

## 2022-11-16 RX ORDER — GLUCOSAMINE SULFATE 500 MG
2 CAPSULE WITH A MEAL CAPSULE ORAL
Status: ACTIVE | COMMUNITY

## 2022-11-16 RX ORDER — ALLOPURINOL 100 MG/1
1 TABLET TABLET ORAL ONCE A DAY
Refills: 0 | Status: ACTIVE | COMMUNITY
Start: 1900-01-01

## 2022-11-16 RX ORDER — SIMVASTATIN 40 MG
1 TABLET IN THE EVENING TABLET ORAL ONCE A DAY
Refills: 0 | Status: ACTIVE | COMMUNITY
Start: 1900-01-01

## 2022-11-16 RX ORDER — TAMSULOSIN HYDROCHLORIDE 0.4 MG/1
CAPSULE ORAL
Qty: 0 | Refills: 0 | Status: ACTIVE | COMMUNITY
Start: 1900-01-01

## 2022-11-16 RX ORDER — RIVAROXABAN 20 MG/1
1 TABLET TABLET, FILM COATED ORAL ONCE A DAY
Refills: 0 | Status: ACTIVE | COMMUNITY
Start: 1900-01-01

## 2022-11-16 RX ORDER — FINASTERIDE 5 MG/1
TAKE 1 TABLET (5 MG) BY ORAL ROUTE ONCE DAILY TABLET, FILM COATED ORAL 1
Qty: 0 | Refills: 0 | Status: ACTIVE | COMMUNITY
Start: 1900-01-01

## 2022-11-16 RX ORDER — POTASSIUM CITRATE 15 MEQ/1
1 TABLET WITH MEALS TABLET, EXTENDED RELEASE ORAL TWICE A DAY
Status: ACTIVE | COMMUNITY

## 2022-11-16 RX ORDER — PANTOPRAZOLE SODIUM 40 MG/1
TAKE 1 TABLET DAILY TABLET, DELAYED RELEASE ORAL
Qty: 90 TABLET | Refills: 4 | Status: ACTIVE | COMMUNITY

## 2022-11-16 RX ORDER — AMLODIPINE BESYLATE AND BENAZEPRIL HYDROCHLORIDE 5; 10 MG/1; MG/1
AS DIRECTED CAPSULE ORAL
Refills: 0 | Status: ACTIVE | COMMUNITY
Start: 1900-01-01

## 2022-11-16 NOTE — HPI-TODAY'S VISIT:
The patient is following up after his recent gastric emptying scan, which was normal.  He still has postprandial bloating and discomfort but has lost 14 pounds on a new low carbohydrate, low protein, high vegetable diet.  This has not improved his constipation and he still has a bowel movement every 2 to 3 days with straining and bloating.  He did try Linzess for the constipation, at 290 mcg, but this led to diarrhea.  There is no blood in the stool, or vomiting but does have nausea.

## 2022-12-19 ENCOUNTER — TELEPHONE ENCOUNTER (OUTPATIENT)
Dept: URBAN - METROPOLITAN AREA CLINIC 118 | Facility: CLINIC | Age: 82
End: 2022-12-19

## 2022-12-19 RX ORDER — SERTRALINE HYDROCHLORIDE 25 MG/1
1 TABLET TABLET, FILM COATED ORAL ONCE A DAY
Qty: 30 TABLET | Refills: 2 | OUTPATIENT
Start: 2022-12-20

## 2023-01-04 ENCOUNTER — OFFICE VISIT (OUTPATIENT)
Dept: URBAN - METROPOLITAN AREA CLINIC 118 | Facility: CLINIC | Age: 83
End: 2023-01-04
Payer: MEDICARE

## 2023-01-04 VITALS
HEART RATE: 63 BPM | WEIGHT: 208.4 LBS | TEMPERATURE: 97.7 F | DIASTOLIC BLOOD PRESSURE: 59 MMHG | HEIGHT: 69 IN | SYSTOLIC BLOOD PRESSURE: 92 MMHG | BODY MASS INDEX: 30.87 KG/M2

## 2023-01-04 DIAGNOSIS — K59.04 CHRONIC IDIOPATHIC CONSTIPATION: ICD-10-CM

## 2023-01-04 DIAGNOSIS — R68.81 EARLY SATIETY: ICD-10-CM

## 2023-01-04 DIAGNOSIS — R63.4 ABNORMAL LOSS OF WEIGHT: ICD-10-CM

## 2023-01-04 PROCEDURE — 99214 OFFICE O/P EST MOD 30 MIN: CPT | Performed by: INTERNAL MEDICINE

## 2023-01-04 RX ORDER — SIMVASTATIN 40 MG
1 TABLET IN THE EVENING TABLET ORAL ONCE A DAY
Refills: 0 | Status: ACTIVE | COMMUNITY
Start: 1900-01-01

## 2023-01-04 RX ORDER — AMLODIPINE BESYLATE AND BENAZEPRIL HYDROCHLORIDE 5; 10 MG/1; MG/1
AS DIRECTED CAPSULE ORAL
Refills: 0 | Status: ACTIVE | COMMUNITY
Start: 1900-01-01

## 2023-01-04 RX ORDER — SERTRALINE HYDROCHLORIDE 25 MG/1
1 TABLET TABLET, FILM COATED ORAL ONCE A DAY
Qty: 30 TABLET | Refills: 2 | Status: ACTIVE | COMMUNITY
Start: 2022-12-20

## 2023-01-04 RX ORDER — POTASSIUM CITRATE 15 MEQ/1
1 TABLET WITH MEALS TABLET, EXTENDED RELEASE ORAL TWICE A DAY
Status: ACTIVE | COMMUNITY

## 2023-01-04 RX ORDER — TAMSULOSIN HYDROCHLORIDE 0.4 MG/1
CAPSULE ORAL
Qty: 0 | Refills: 0 | Status: ACTIVE | COMMUNITY
Start: 1900-01-01

## 2023-01-04 RX ORDER — ALLOPURINOL 100 MG/1
1 TABLET TABLET ORAL ONCE A DAY
Refills: 0 | Status: ACTIVE | COMMUNITY
Start: 1900-01-01

## 2023-01-04 RX ORDER — RIVAROXABAN 20 MG/1
1 TABLET TABLET, FILM COATED ORAL ONCE A DAY
Refills: 0 | Status: ACTIVE | COMMUNITY
Start: 1900-01-01

## 2023-01-04 RX ORDER — FINASTERIDE 5 MG/1
TAKE 1 TABLET (5 MG) BY ORAL ROUTE ONCE DAILY TABLET, FILM COATED ORAL 1
Qty: 0 | Refills: 0 | Status: ACTIVE | COMMUNITY
Start: 1900-01-01

## 2023-01-04 RX ORDER — PANTOPRAZOLE SODIUM 40 MG/1
TAKE 1 TABLET DAILY TABLET, DELAYED RELEASE ORAL
Qty: 90 TABLET | Refills: 4 | Status: ACTIVE | COMMUNITY

## 2023-01-04 RX ORDER — GLUCOSAMINE SULFATE 500 MG
2 CAPSULE WITH A MEAL CAPSULE ORAL
Status: ACTIVE | COMMUNITY

## 2023-01-04 NOTE — HPI-TODAY'S VISIT:
The patient is following up after an office visit in November.  His weight is relatively stable and only down 3 pounds.  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  There was no evidence of bowel obstruction or mass lesion.  He has no nausea, vomiting, or blood in his stools but does still suffer from chronic constipation.  He did try Linzess 72 and 290 mcg, and these both cause diarrhea.  He does still deal with significant stress at home including a wife with dementia and a daughter with terminal ovarian cancer.  He says his appetite is fair but he has difficulty motivating himself to eat and feels full after a few bites.  He will often only eat 1 meal a day.

## 2023-03-07 ENCOUNTER — OFFICE VISIT (OUTPATIENT)
Dept: URBAN - METROPOLITAN AREA CLINIC 118 | Facility: CLINIC | Age: 83
End: 2023-03-07
Payer: MEDICARE

## 2023-03-07 VITALS
WEIGHT: 219 LBS | HEART RATE: 66 BPM | DIASTOLIC BLOOD PRESSURE: 73 MMHG | HEIGHT: 69 IN | TEMPERATURE: 98.2 F | SYSTOLIC BLOOD PRESSURE: 106 MMHG | BODY MASS INDEX: 32.44 KG/M2

## 2023-03-07 DIAGNOSIS — R68.81 EARLY SATIETY: ICD-10-CM

## 2023-03-07 DIAGNOSIS — R63.4 ABNORMAL LOSS OF WEIGHT: ICD-10-CM

## 2023-03-07 DIAGNOSIS — K59.04 CHRONIC IDIOPATHIC CONSTIPATION: ICD-10-CM

## 2023-03-07 PROBLEM — 82934008: Status: ACTIVE | Noted: 2022-10-26

## 2023-03-07 PROCEDURE — 99213 OFFICE O/P EST LOW 20 MIN: CPT | Performed by: INTERNAL MEDICINE

## 2023-03-07 RX ORDER — SERTRALINE HYDROCHLORIDE 25 MG/1
1 TABLET TABLET, FILM COATED ORAL ONCE A DAY
Qty: 30 TABLET | Refills: 2 | Status: DISCONTINUED | COMMUNITY
Start: 2022-12-20

## 2023-03-07 RX ORDER — METHYLSULFONYLMETHANE 1000 MG
AS DIRECTED TABLET ORAL
Status: ACTIVE | COMMUNITY

## 2023-03-07 RX ORDER — GLUCOSAMINE SULFATE 500 MG
2 CAPSULE WITH A MEAL CAPSULE ORAL
Status: DISCONTINUED | COMMUNITY

## 2023-03-07 RX ORDER — SERTRALINE 25 MG/1
1 TABLET TABLET, FILM COATED ORAL ONCE A DAY
Status: ACTIVE | COMMUNITY

## 2023-03-07 RX ORDER — ALLOPURINOL 100 MG/1
1 TABLET TABLET ORAL ONCE A DAY
Refills: 0 | Status: ACTIVE | COMMUNITY
Start: 1900-01-01

## 2023-03-07 RX ORDER — SIMVASTATIN 40 MG
1 TABLET IN THE EVENING TABLET ORAL ONCE A DAY
Refills: 0 | Status: ACTIVE | COMMUNITY
Start: 1900-01-01

## 2023-03-07 RX ORDER — AMLODIPINE BESYLATE AND BENAZEPRIL HYDROCHLORIDE 5; 10 MG/1; MG/1
AS DIRECTED CAPSULE ORAL
Refills: 0 | Status: ACTIVE | COMMUNITY
Start: 1900-01-01

## 2023-03-07 RX ORDER — POTASSIUM CITRATE 15 MEQ/1
1 TABLET WITH MEALS TABLET, EXTENDED RELEASE ORAL TWICE A DAY
Status: ACTIVE | COMMUNITY

## 2023-03-07 RX ORDER — PANTOPRAZOLE SODIUM 40 MG/1
TAKE 1 TABLET DAILY TABLET, DELAYED RELEASE ORAL
Qty: 90 TABLET | Refills: 4 | Status: ACTIVE | COMMUNITY

## 2023-03-07 RX ORDER — FINASTERIDE 5 MG/1
TAKE 1 TABLET (5 MG) BY ORAL ROUTE ONCE DAILY TABLET, FILM COATED ORAL 1
Qty: 0 | Refills: 0 | Status: ACTIVE | COMMUNITY
Start: 1900-01-01

## 2023-03-07 RX ORDER — RIVAROXABAN 20 MG/1
1 TABLET TABLET, FILM COATED ORAL ONCE A DAY
Refills: 0 | Status: ACTIVE | COMMUNITY
Start: 1900-01-01

## 2023-03-07 RX ORDER — MULTIVIT WITH MINERALS/LUTEIN
AS DIRECTED TABLET ORAL
Status: ACTIVE | COMMUNITY

## 2023-03-07 RX ORDER — TAMSULOSIN HYDROCHLORIDE 0.4 MG/1
CAPSULE ORAL
Qty: 0 | Refills: 0 | Status: ACTIVE | COMMUNITY
Start: 1900-01-01

## 2023-03-07 NOTE — HPI-TODAY'S VISIT:
The patient is following up from an office visit in January.  Since that time his weight is increased 10 pounds.  He denies nausea, vomiting, or severe abdominal pain.  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  He does continue to struggle with constipation and samples of Trulance did not provide any relief.  He has not tried over-the-counter medicine such as milk of magnesia.  There is no blood in the stools.  He has a bowel movement about every 2 to 3 days.

## 2023-03-24 ENCOUNTER — TELEPHONE ENCOUNTER (OUTPATIENT)
Dept: URBAN - METROPOLITAN AREA CLINIC 35 | Facility: CLINIC | Age: 83
End: 2023-03-24

## 2023-03-24 RX ORDER — PANTOPRAZOLE SODIUM 40 MG/1
TAKE 1 TABLET DAILY TABLET, DELAYED RELEASE ORAL
Qty: 90 TABLET | Refills: 3

## 2023-09-12 ENCOUNTER — DASHBOARD ENCOUNTERS (OUTPATIENT)
Age: 83
End: 2023-09-12

## 2023-09-12 ENCOUNTER — OFFICE VISIT (OUTPATIENT)
Dept: URBAN - METROPOLITAN AREA CLINIC 118 | Facility: CLINIC | Age: 83
End: 2023-09-12
Payer: MEDICARE

## 2023-09-12 VITALS
SYSTOLIC BLOOD PRESSURE: 165 MMHG | HEART RATE: 63 BPM | TEMPERATURE: 98.1 F | DIASTOLIC BLOOD PRESSURE: 91 MMHG | WEIGHT: 226.2 LBS | HEIGHT: 69 IN | BODY MASS INDEX: 33.5 KG/M2

## 2023-09-12 DIAGNOSIS — K59.04 CHRONIC IDIOPATHIC CONSTIPATION: ICD-10-CM

## 2023-09-12 PROCEDURE — 99213 OFFICE O/P EST LOW 20 MIN: CPT | Performed by: INTERNAL MEDICINE

## 2023-09-12 RX ORDER — SIMVASTATIN 40 MG
1 TABLET IN THE EVENING TABLET ORAL ONCE A DAY
Refills: 0 | Status: ACTIVE | COMMUNITY
Start: 1900-01-01

## 2023-09-12 RX ORDER — METHYLSULFONYLMETHANE 1000 MG
AS DIRECTED TABLET ORAL
Status: DISCONTINUED | COMMUNITY

## 2023-09-12 RX ORDER — SERTRALINE 25 MG/1
1 TABLET TABLET, FILM COATED ORAL ONCE A DAY
Status: DISCONTINUED | COMMUNITY

## 2023-09-12 RX ORDER — AMLODIPINE BESYLATE AND BENAZEPRIL HYDROCHLORIDE 5; 10 MG/1; MG/1
AS DIRECTED CAPSULE ORAL
Refills: 0 | Status: ACTIVE | COMMUNITY
Start: 1900-01-01

## 2023-09-12 RX ORDER — TAMSULOSIN HYDROCHLORIDE 0.4 MG/1
CAPSULE ORAL
Qty: 0 | Refills: 0 | Status: ACTIVE | COMMUNITY
Start: 1900-01-01

## 2023-09-12 RX ORDER — POTASSIUM CITRATE 15 MEQ/1
1 TABLET WITH MEALS TABLET, EXTENDED RELEASE ORAL TWICE A DAY
Status: ACTIVE | COMMUNITY

## 2023-09-12 RX ORDER — PANTOPRAZOLE SODIUM 40 MG/1
TAKE 1 TABLET DAILY TABLET, DELAYED RELEASE ORAL
Qty: 90 TABLET | Refills: 3 | Status: ACTIVE | COMMUNITY

## 2023-09-12 RX ORDER — FINASTERIDE 5 MG/1
TAKE 1 TABLET (5 MG) BY ORAL ROUTE ONCE DAILY TABLET, FILM COATED ORAL 1
Qty: 0 | Refills: 0 | Status: ACTIVE | COMMUNITY
Start: 1900-01-01

## 2023-09-12 RX ORDER — RIVAROXABAN 20 MG/1
1 TABLET TABLET, FILM COATED ORAL ONCE A DAY
Refills: 0 | Status: ACTIVE | COMMUNITY
Start: 1900-01-01

## 2023-09-12 RX ORDER — ALLOPURINOL 100 MG/1
1 TABLET TABLET ORAL ONCE A DAY
Refills: 0 | Status: ACTIVE | COMMUNITY
Start: 1900-01-01

## 2023-09-12 RX ORDER — MULTIVIT WITH MINERALS/LUTEIN
AS DIRECTED TABLET ORAL
Status: ACTIVE | COMMUNITY

## 2023-09-12 NOTE — HPI-TODAY'S VISIT:
The patient is following up after an office visit in March.  He still struggles with constipation but if he takes milk of magnesia daily he found it was too powerful and led to loose stools and almost overwhelming diarrhea.  Without laxative therapy has a bowel movement 2-3 times per week.  There is occasional rectal hemorrhage if he has a hard or difficult to pass has a hard or difficult to pass stool.  He has had no weight loss, vomiting, or severe abdominal pain; there is bloating and discomfort with constipation.  His weight is up 7 pounds.

## 2024-12-27 ENCOUNTER — ERX REFILL RESPONSE (OUTPATIENT)
Dept: URBAN - METROPOLITAN AREA CLINIC 118 | Facility: CLINIC | Age: 84
End: 2024-12-27

## 2024-12-27 RX ORDER — PANTOPRAZOLE SODIUM 40 MG/1
TAKE 1 TABLET DAILY TABLET, DELAYED RELEASE ORAL
Qty: 90 TABLET | Refills: 3 | OUTPATIENT

## 2025-02-04 ENCOUNTER — OFFICE VISIT (OUTPATIENT)
Dept: URBAN - METROPOLITAN AREA CLINIC 118 | Facility: CLINIC | Age: 85
End: 2025-02-04

## 2025-02-25 ENCOUNTER — OFFICE VISIT (OUTPATIENT)
Dept: URBAN - METROPOLITAN AREA CLINIC 118 | Facility: CLINIC | Age: 85
End: 2025-02-25

## 2025-02-25 VITALS
TEMPERATURE: 97.7 F | SYSTOLIC BLOOD PRESSURE: 99 MMHG | HEIGHT: 67 IN | WEIGHT: 238 LBS | BODY MASS INDEX: 37.35 KG/M2 | DIASTOLIC BLOOD PRESSURE: 63 MMHG | HEART RATE: 76 BPM

## 2025-02-25 RX ORDER — POTASSIUM CITRATE 15 MEQ/1
1 TABLET WITH MEALS TABLET, EXTENDED RELEASE ORAL TWICE A DAY
Status: ACTIVE | COMMUNITY

## 2025-02-25 RX ORDER — MULTIVIT WITH MINERALS/LUTEIN
AS DIRECTED TABLET ORAL
Status: ACTIVE | COMMUNITY

## 2025-02-25 RX ORDER — WARFARIN SODIUM 7.5 MG/1
TABLET ORAL
Qty: 30 TABLET | Status: ACTIVE | COMMUNITY

## 2025-02-25 RX ORDER — TAMSULOSIN HYDROCHLORIDE 0.4 MG/1
CAPSULE ORAL
Qty: 180 CAPSULE | Status: ACTIVE | COMMUNITY

## 2025-02-25 RX ORDER — RIVAROXABAN 20 MG/1
1 TABLET TABLET, FILM COATED ORAL ONCE A DAY
Refills: 0 | Status: ACTIVE | COMMUNITY
Start: 1900-01-01

## 2025-02-25 RX ORDER — PANTOPRAZOLE SODIUM 40 MG/1
TAKE 1 TABLET DAILY TABLET, DELAYED RELEASE ORAL
Qty: 90 TABLET | Refills: 3 | Status: ACTIVE | COMMUNITY

## 2025-02-25 RX ORDER — AMLODIPINE BESYLATE AND BENAZEPRIL HYDROCHLORIDE 5; 10 MG/1; MG/1
CAPSULE ORAL
Qty: 90 CAPSULE | Status: ACTIVE | COMMUNITY

## 2025-02-25 RX ORDER — WARFARIN SODIUM 5 MG/1
TAKE ONE TABLET BY MOUTH ONE TIME DAILY TABLET ORAL
Qty: 30 UNSPECIFIED | Refills: 2 | Status: ACTIVE | COMMUNITY

## 2025-02-25 RX ORDER — SIMVASTATIN 40 MG
1 TABLET IN THE EVENING TABLET ORAL ONCE A DAY
Refills: 0 | Status: ACTIVE | COMMUNITY
Start: 1900-01-01

## 2025-02-25 RX ORDER — METOPROLOL TARTRATE 25 MG/1
TAKE ONE TABLET BY MOUTH EVERY MORNING AND TAKE ONE TABLET BY MOUTH AT BEDTIME TABLET, FILM COATED ORAL
Qty: 180 UNSPECIFIED | Refills: 0 | Status: ACTIVE | COMMUNITY

## 2025-02-25 RX ORDER — FINASTERIDE 5 MG/1
TABLET, FILM COATED ORAL
Qty: 90 TABLET | Status: ACTIVE | COMMUNITY

## 2025-02-25 RX ORDER — ALLOPURINOL 100 MG/1
1 TABLET TABLET ORAL ONCE A DAY
Refills: 0 | Status: ACTIVE | COMMUNITY
Start: 1900-01-01

## 2025-02-25 NOTE — HPI-TODAY'S VISIT:
The patient is following up from an office visit in .  Since that time his daughters  of ovarian cancer and his wife  after lung biopsy for possible recurrent cancer.  He has had chronic bloating and abdominal distention.  This is consistent with his chronic constipation.  He does respond to Linzess but had tried over-the-counter laxatives.  He will have a bowel movement every 3 days.  He has overwhelming diarrhea when he misses more than 2 to 3 days of a bowel movement.  He has associated bloating but no weight loss.  Linzess 145 mcg was too powerful and he would occasionally have incontinence of stools.  His weight is up 4 pounds from his last visit and there is no blood in the stools.

## 2025-02-26 ENCOUNTER — OFFICE VISIT (OUTPATIENT)
Dept: URBAN - METROPOLITAN AREA CLINIC 118 | Facility: CLINIC | Age: 85
End: 2025-02-26

## 2025-03-12 ENCOUNTER — TELEPHONE ENCOUNTER (OUTPATIENT)
Dept: URBAN - METROPOLITAN AREA CLINIC 118 | Facility: CLINIC | Age: 85
End: 2025-03-12

## 2025-03-13 ENCOUNTER — OFFICE VISIT (OUTPATIENT)
Dept: URBAN - METROPOLITAN AREA CLINIC 109 | Facility: CLINIC | Age: 85
End: 2025-03-13

## 2025-03-26 ENCOUNTER — LAB OUTSIDE AN ENCOUNTER (OUTPATIENT)
Dept: URBAN - METROPOLITAN AREA CLINIC 118 | Facility: CLINIC | Age: 85
End: 2025-03-26

## 2025-03-26 ENCOUNTER — OFFICE VISIT (OUTPATIENT)
Dept: URBAN - METROPOLITAN AREA CLINIC 118 | Facility: CLINIC | Age: 85
End: 2025-03-26
Payer: MEDICARE

## 2025-03-26 DIAGNOSIS — R05.3 CHRONIC COUGH: ICD-10-CM

## 2025-03-26 DIAGNOSIS — R10.84 GENERALIZED ABDOMINAL PAIN: ICD-10-CM

## 2025-03-26 DIAGNOSIS — R63.4 ABNORMAL WEIGHT LOSS: ICD-10-CM

## 2025-03-26 DIAGNOSIS — K59.04 CHRONIC IDIOPATHIC CONSTIPATION: ICD-10-CM

## 2025-03-26 PROCEDURE — 99213 OFFICE O/P EST LOW 20 MIN: CPT | Performed by: INTERNAL MEDICINE

## 2025-03-26 RX ORDER — METOPROLOL TARTRATE 25 MG/1
TAKE ONE TABLET BY MOUTH EVERY MORNING AND TAKE ONE TABLET BY MOUTH AT BEDTIME TABLET, FILM COATED ORAL
Qty: 180 UNSPECIFIED | Refills: 0 | Status: ACTIVE | COMMUNITY

## 2025-03-26 RX ORDER — TAMSULOSIN HYDROCHLORIDE 0.4 MG/1
CAPSULE ORAL
Qty: 180 CAPSULE | Status: ACTIVE | COMMUNITY

## 2025-03-26 RX ORDER — FINASTERIDE 5 MG/1
TABLET, FILM COATED ORAL
Qty: 90 TABLET | Status: ACTIVE | COMMUNITY

## 2025-03-26 RX ORDER — ALLOPURINOL 100 MG/1
1 TABLET TABLET ORAL ONCE A DAY
Refills: 0 | Status: ACTIVE | COMMUNITY
Start: 1900-01-01

## 2025-03-26 RX ORDER — WARFARIN SODIUM 5 MG/1
TAKE ONE TABLET BY MOUTH ONE TIME DAILY TABLET ORAL
Qty: 30 UNSPECIFIED | Refills: 2 | Status: ACTIVE | COMMUNITY

## 2025-03-26 RX ORDER — LINACLOTIDE 145 UG/1
1 CAPSULE CAPSULE, GELATIN COATED ORAL ONCE A DAY
Qty: 90 CAPSULE | Refills: 3 | OUTPATIENT
Start: 2025-03-26 | End: 2026-03-21

## 2025-03-26 RX ORDER — WARFARIN SODIUM 5 MG/1
1 TABLET TABLET ORAL ONCE A DAY
Status: ACTIVE | COMMUNITY
Start: 2025-03-05

## 2025-03-26 RX ORDER — POTASSIUM CITRATE 15 MEQ/1
1 TABLET WITH MEALS TABLET, EXTENDED RELEASE ORAL TWICE A DAY
Status: ACTIVE | COMMUNITY

## 2025-03-26 RX ORDER — PANTOPRAZOLE SODIUM 40 MG/1
TAKE 1 TABLET DAILY TABLET, DELAYED RELEASE ORAL
Qty: 90 TABLET | Refills: 3 | Status: ACTIVE | COMMUNITY

## 2025-03-26 RX ORDER — AMLODIPINE BESYLATE AND BENAZEPRIL HYDROCHLORIDE 5; 10 MG/1; MG/1
CAPSULE ORAL
Qty: 90 CAPSULE | Status: ACTIVE | COMMUNITY

## 2025-03-26 RX ORDER — SIMVASTATIN 40 MG
1 TABLET IN THE EVENING TABLET ORAL ONCE A DAY
Refills: 0 | Status: ACTIVE | COMMUNITY
Start: 1900-01-01

## 2025-03-26 NOTE — HPI-TODAY'S VISIT:
The patient is following up from an office visit in February.  He tried Linzess 72 mcg for his constipation, and was still constipated.  However the 145 mcg dose led to 2-3 liquid bowel movements per day.  He had no blood in the bowel movement.  He does still have chronic bronchitis with phlegm production but no fevers after his COVID-pneumonia in February.  His abdominal pain is diffuse and active usually associated with constipation, and he has lost 6 pounds.  However he has had severe life stressors and just returned from the  of a niece in [place].  He has not seen a pulmonary doctor recently for his bronchitis.

## 2025-06-26 ENCOUNTER — OFFICE VISIT (OUTPATIENT)
Dept: URBAN - METROPOLITAN AREA CLINIC 118 | Facility: CLINIC | Age: 85
End: 2025-06-26
Payer: MEDICARE

## 2025-06-26 ENCOUNTER — LAB OUTSIDE AN ENCOUNTER (OUTPATIENT)
Dept: URBAN - METROPOLITAN AREA CLINIC 118 | Facility: CLINIC | Age: 85
End: 2025-06-26

## 2025-06-26 DIAGNOSIS — K59.04 CHRONIC IDIOPATHIC CONSTIPATION: ICD-10-CM

## 2025-06-26 DIAGNOSIS — R63.4 ABNORMAL WEIGHT LOSS: ICD-10-CM

## 2025-06-26 DIAGNOSIS — R10.84 GENERALIZED ABDOMINAL PAIN: ICD-10-CM

## 2025-06-26 DIAGNOSIS — Z86.718 HISTORY OF DVT (DEEP VEIN THROMBOSIS): ICD-10-CM

## 2025-06-26 PROCEDURE — 99214 OFFICE O/P EST MOD 30 MIN: CPT | Performed by: INTERNAL MEDICINE

## 2025-06-26 RX ORDER — LINACLOTIDE 145 UG/1
1 CAPSULE CAPSULE, GELATIN COATED ORAL ONCE A DAY
Qty: 90 CAPSULE | Refills: 3 | Status: ACTIVE | COMMUNITY
Start: 2025-03-26 | End: 2026-03-21

## 2025-06-26 RX ORDER — METOPROLOL TARTRATE 25 MG/1
TAKE ONE TABLET BY MOUTH EVERY MORNING AND TAKE ONE TABLET BY MOUTH AT BEDTIME TABLET, FILM COATED ORAL
Qty: 180 UNSPECIFIED | Refills: 0 | Status: ACTIVE | COMMUNITY

## 2025-06-26 RX ORDER — PANTOPRAZOLE SODIUM 40 MG/1
TAKE 1 TABLET DAILY TABLET, DELAYED RELEASE ORAL
Qty: 90 TABLET | Refills: 3 | Status: ACTIVE | COMMUNITY

## 2025-06-26 RX ORDER — POTASSIUM CITRATE 15 MEQ/1
1 TABLET WITH MEALS TABLET, EXTENDED RELEASE ORAL TWICE A DAY
Status: ACTIVE | COMMUNITY

## 2025-06-26 RX ORDER — WARFARIN SODIUM 5 MG/1
TAKE ONE TABLET BY MOUTH ONE TIME DAILY TABLET ORAL
Qty: 30 UNSPECIFIED | Refills: 2 | Status: ACTIVE | COMMUNITY

## 2025-06-26 RX ORDER — SIMVASTATIN 40 MG
1 TABLET IN THE EVENING TABLET ORAL ONCE A DAY
Refills: 0 | Status: ACTIVE | COMMUNITY
Start: 1900-01-01

## 2025-06-26 RX ORDER — AMLODIPINE BESYLATE AND BENAZEPRIL HYDROCHLORIDE 5; 10 MG/1; MG/1
CAPSULE ORAL
Qty: 90 CAPSULE | Status: ACTIVE | COMMUNITY

## 2025-06-26 RX ORDER — TAMSULOSIN HYDROCHLORIDE 0.4 MG/1
CAPSULE ORAL
Qty: 180 CAPSULE | Status: ACTIVE | COMMUNITY

## 2025-06-26 RX ORDER — WARFARIN SODIUM 5 MG/1
1 TABLET TABLET ORAL ONCE A DAY
Status: ACTIVE | COMMUNITY
Start: 2025-03-05

## 2025-06-26 RX ORDER — ALLOPURINOL 100 MG/1
1 TABLET TABLET ORAL ONCE A DAY
Refills: 0 | Status: ACTIVE | COMMUNITY
Start: 1900-01-01

## 2025-06-26 RX ORDER — FINASTERIDE 5 MG/1
TABLET, FILM COATED ORAL
Qty: 90 TABLET | Status: ACTIVE | COMMUNITY

## 2025-06-26 NOTE — PHYSICAL EXAM NECK/THYROID:
normal appearance , no lymphadenopathy, without tenderness upon palpation , no deformities , trachea midline , thyroid normal size , no masses , thyroid nontender

## 2025-06-26 NOTE — HPI-TODAY'S VISIT:
The patient is following up after an office visit in March.  We have scheduled a CT scan to evaluate his weight loss and generalized abdominal pain but he was not able to complete that.  Since that time he has had no nausea or vomiting and his weight is stable.  There is no blood in the stool but he is alternating between constipation and diarrhea spite the use of Linzess.  His abdominal pain is generalized and often postprandial or with irregular bowel habits.  He denies severe blood in the stool despite the use of daily Coumadin.  He has seen pulmonary for his chronic cough and a sleep study is pending.  He was also given albuterol for reactive airway.

## 2025-07-14 ENCOUNTER — TELEPHONE ENCOUNTER (OUTPATIENT)
Dept: URBAN - METROPOLITAN AREA CLINIC 118 | Facility: CLINIC | Age: 85
End: 2025-07-14